# Patient Record
Sex: FEMALE | NOT HISPANIC OR LATINO | URBAN - METROPOLITAN AREA
[De-identification: names, ages, dates, MRNs, and addresses within clinical notes are randomized per-mention and may not be internally consistent; named-entity substitution may affect disease eponyms.]

---

## 2020-01-09 ENCOUNTER — TELEPHONE (OUTPATIENT)
Dept: OBSTETRICS AND GYNECOLOGY | Facility: CLINIC | Age: 40
End: 2020-01-09

## 2020-01-09 NOTE — TELEPHONE ENCOUNTER
Spoke with Rachel, no Macedonian  needed. Patient desires to be seen 3/2-3/15. Patient scheduled for consult c/s v's

## 2020-01-09 NOTE — TELEPHONE ENCOUNTER
----- Message from Graciela Mota sent at 1/9/2020 10:12 AM CST -----  Contact: [facility]  Name of Who is Calling: [facility]      What is the request in detail: Rachel states that pt would like to be seen by Dr. Davila IV for a second opinion in regards to her pregnancy.Rachel states that pt saw online that Dr. Davila is the best in [place] and will be in the country this month.  Please contact to further discuss and advise.      Can the clinic reply by [facility]: n      What Number to Call Back if not [facility] [id number]